# Patient Record
Sex: MALE | Race: WHITE
[De-identification: names, ages, dates, MRNs, and addresses within clinical notes are randomized per-mention and may not be internally consistent; named-entity substitution may affect disease eponyms.]

---

## 2017-04-28 ENCOUNTER — HOSPITAL ENCOUNTER (EMERGENCY)
Dept: HOSPITAL 23 - CED | Age: 32
Discharge: HOME | End: 2017-04-28
Payer: COMMERCIAL

## 2017-04-28 DIAGNOSIS — R42: Primary | ICD-10-CM

## 2017-04-28 LAB
BARBITURATES UR QL SCN: 0.5 MG/DL
BARBITURATES UR QL SCN: 4.6 G/DL
BASOPHIL#: 0 X10E3
BASOPHIL%: 0.3 %
BENZODIAZ UR QL SCN: 24 U/L
BENZODIAZ UR QL SCN: 25 U/L
BLOOD UREA NITROGEN: 11 MG/DL
BUN/CREATININE RATIO: 13.75
BZE UR QL SCN: 68 U/L
CALCIUM SERUM: 9.3 MG/DL
CK MB SERPL-RTO: 12.9 %
CK MB SERPL-RTO: 33.4 G/DL
CREATININE SERUM: 0.8 MG/DL
DIFF IND: NO
EOSINOPHIL#: 0 X10E3
EOSINOPHIL%: 0.2 %
GLOM FILT RATE ESTIMATED: 118.2 ML/MIN
GLUCOSE FASTING: 92 MG/DL
HEMATOCRIT: 46.6 %
HEMOGLOBIN: 15.6 GM/DL
KETONES UR QL: 103 MMOL/L
KETONES UR QL: 27 MMOL/L
LYMPHOCYTE#: 1.5 X10E3
LYMPHOCYTE%: 12.8 %
MEAN CELL VOLUME: 88.4 FL
MEAN CORPUSCULAR HEMOGLOBIN: 29.5 PG
MEAN PLATELET VOLUME: 8.1 FL
METHADONE UR QL SCN: 1.6 NG/ML
MONOCYTE#: 0.5 X10E3
MONOCYTE%: 4.1 %
NEUTROPHIL#: 9.7 X10E3
NEUTROPHIL%: 82.6 %
PLATELET COUNT: 338 X10E3
POC - TROPONIN: <0.05 NG/ML
POTASSIUM: 3.8 MMOL/L
PROTEIN TOTAL SERUM: 7.6 G/DL
RED BLOOD COUNT: 5.27 X10E
SODIUM: 137 MMOL/L
WHITE BLOOD COUNT: 11.8 X10E3

## 2017-04-28 PROCEDURE — A9577 INJ MULTIHANCE: HCPCS

## 2017-06-03 ENCOUNTER — HOSPITAL ENCOUNTER (EMERGENCY)
Dept: HOSPITAL 23 - CED | Age: 32
Discharge: HOME | End: 2017-06-03
Payer: COMMERCIAL

## 2017-06-03 DIAGNOSIS — F41.9: ICD-10-CM

## 2017-06-03 DIAGNOSIS — R07.89: Primary | ICD-10-CM

## 2017-06-03 LAB
AMYLASE: 73 U/L (ref 0–46)
BARBITURATES UR QL SCN: 0.5 MG/DL (ref 0.2–2)
BARBITURATES UR QL SCN: 4.3 G/DL (ref 3.5–5)
BASOPHIL#: 0 X10E3 (ref 0–0.3)
BASOPHIL%: 0.3 % (ref 0–2.5)
BENZODIAZ UR QL SCN: 24 U/L (ref 10–42)
BENZODIAZ UR QL SCN: 27 U/L (ref 10–40)
BLOOD UREA NITROGEN: 7 MG/DL (ref 9–23)
BUN/CREATININE RATIO: 10
BZE UR QL SCN: 84 U/L (ref 32–92)
CALCIUM SERUM: 9 MG/DL (ref 8.4–10.2)
CK MB SERPL-RTO: 12.9 % (ref 11–15.5)
CK MB SERPL-RTO: 34 G/DL (ref 30–36)
CREATININE SERUM: 0.7 MG/DL (ref 0.6–1.4)
DIFF IND: NO
EOSINOPHIL#: 0 X10E3 (ref 0–0.7)
EOSINOPHIL%: 0.3 % (ref 0–7)
GLOM FILT RATE ESTIMATED: 124.9 ML/MIN (ref 60–?)
GLUCOSE FASTING: 103 MG/DL (ref 70–110)
HEMATOCRIT: 44.3 % (ref 38–50)
HEMOGLOBIN: 15.1 GM/DL (ref 13–16)
HIV1+2 AB SPEC QL IA.RAPID: 28.9 SECONDS (ref 23.5–31.3)
INR: 0.9
KETONES UR QL: 107 MMOL/L (ref 100–111)
KETONES UR QL: 23 MMOL/L (ref 22–31)
LIPASE: 106 U/L (ref 22–51)
LYMPHOCYTE#: 1.7 X10E3 (ref 1–3.5)
LYMPHOCYTE%: 13 % (ref 17–45)
MEAN CELL VOLUME: 87 FL (ref 83–96)
MEAN CORPUSCULAR HEMOGLOBIN: 29.6 PG (ref 28–34)
MEAN PLATELET VOLUME: 7.7 FL (ref 6.5–11.5)
METHADONE UR QL SCN: <1 NG/ML (ref 0–7.9)
METHADONE UR QL SCN: <1 NG/ML (ref 0–7.9)
MONOCYTE#: 0.8 X10E3 (ref 0–1)
MONOCYTE%: 5.9 % (ref 3–12)
NEUTROPHIL#: 10.8 X10E3 (ref 1.5–7.1)
NEUTROPHIL%: 80.5 % (ref 40–75)
PLATELET COUNT: 337 X10E3 (ref 140–420)
POC - TROPONIN: <0.05 NG/ML (ref ?–0.05)
POC - TROPONIN: <0.05 NG/ML (ref ?–0.05)
POTASSIUM: 3.6 MMOL/L (ref 3.5–5.1)
PROTEIN TOTAL SERUM: 7.2 G/DL (ref 6–8.3)
PROTHROMBIN TIME (PATIENT): 9.8 SECONDS (ref 9.6–11.5)
RED BLOOD COUNT: 5.09 X10E (ref 3.9–5.6)
SODIUM: 137 MMOL/L (ref 135–145)
WHITE BLOOD COUNT: 13.4 X10E3 (ref 4–10.5)

## 2017-08-30 ENCOUNTER — DOCUMENTATION (OUTPATIENT)
Dept: PHYSICAL THERAPY | Facility: CLINIC | Age: 32
End: 2017-08-30

## 2017-08-30 NOTE — PROGRESS NOTES
Discharge Summary  Discharge Summary from Physical Therapy Report      Dates  PT visit: 11/3/16-11/14/16  Number of Visits: 4     Discharge Status of Patient: See MD Note dated 11/14/16    Goals: Not Met    Discharge Plan: Patient to return to referring/providing physician    Comments N/A    Date of Discharge 8/30/17        Jayashree Scherer, PT  Physical Therapist